# Patient Record
Sex: FEMALE | Race: BLACK OR AFRICAN AMERICAN | NOT HISPANIC OR LATINO
[De-identification: names, ages, dates, MRNs, and addresses within clinical notes are randomized per-mention and may not be internally consistent; named-entity substitution may affect disease eponyms.]

---

## 2022-07-30 ENCOUNTER — TRANSCRIPTION ENCOUNTER (OUTPATIENT)
Age: 77
End: 2022-07-30

## 2023-03-21 ENCOUNTER — APPOINTMENT (OUTPATIENT)
Dept: SURGERY | Facility: CLINIC | Age: 78
End: 2023-03-21

## 2023-04-05 ENCOUNTER — APPOINTMENT (OUTPATIENT)
Dept: SURGERY | Facility: CLINIC | Age: 78
End: 2023-04-05

## 2023-09-13 PROBLEM — Z00.00 ENCOUNTER FOR PREVENTIVE HEALTH EXAMINATION: Status: ACTIVE | Noted: 2023-09-13

## 2023-09-14 ENCOUNTER — APPOINTMENT (OUTPATIENT)
Dept: NEUROLOGY | Facility: CLINIC | Age: 78
End: 2023-09-14
Payer: MEDICARE

## 2023-09-14 ENCOUNTER — NON-APPOINTMENT (OUTPATIENT)
Age: 78
End: 2023-09-14

## 2023-09-14 VITALS
SYSTOLIC BLOOD PRESSURE: 138 MMHG | OXYGEN SATURATION: 97 % | BODY MASS INDEX: 25.61 KG/M2 | HEART RATE: 85 BPM | WEIGHT: 150 LBS | DIASTOLIC BLOOD PRESSURE: 82 MMHG | HEIGHT: 64 IN

## 2023-09-14 DIAGNOSIS — G43.911 MIGRAINE, UNSPECIFIED, INTRACTABLE, WITH STATUS MIGRAINOSUS: ICD-10-CM

## 2023-09-14 DIAGNOSIS — I26.99 OTHER PULMONARY EMBOLISM W/OUT ACUTE COR PULMONALE: ICD-10-CM

## 2023-09-14 DIAGNOSIS — E78.5 HYPERLIPIDEMIA, UNSPECIFIED: ICD-10-CM

## 2023-09-14 DIAGNOSIS — D32.0 BENIGN NEOPLASM OF CEREBRAL MENINGES: ICD-10-CM

## 2023-09-14 DIAGNOSIS — Z87.891 PERSONAL HISTORY OF NICOTINE DEPENDENCE: ICD-10-CM

## 2023-09-14 DIAGNOSIS — Z80.1 FAMILY HISTORY OF MALIGNANT NEOPLASM OF TRACHEA, BRONCHUS AND LUNG: ICD-10-CM

## 2023-09-14 DIAGNOSIS — I82.409 ACUTE EMBOLISM AND THROMBOSIS OF UNSPECIFIED DEEP VEINS OF UNSPECIFIED LOWER EXTREMITY: ICD-10-CM

## 2023-09-14 DIAGNOSIS — G35 MULTIPLE SCLEROSIS: ICD-10-CM

## 2023-09-14 DIAGNOSIS — E11.59 TYPE 2 DIABETES MELLITUS WITH OTHER CIRCULATORY COMPLICATIONS: ICD-10-CM

## 2023-09-14 DIAGNOSIS — Z86.69 PERSONAL HISTORY OF OTHER DISEASES OF THE NERVOUS SYSTEM AND SENSE ORGANS: ICD-10-CM

## 2023-09-14 PROCEDURE — 99205 OFFICE O/P NEW HI 60 MIN: CPT

## 2023-09-21 PROBLEM — E78.5 HYPERLIPIDEMIA: Status: ACTIVE | Noted: 2023-09-21

## 2023-09-21 PROBLEM — I82.409 DVT (DEEP VENOUS THROMBOSIS): Status: ACTIVE | Noted: 2023-09-21

## 2023-09-21 PROBLEM — G35 MULTIPLE SCLEROSIS: Status: ACTIVE | Noted: 2023-09-21

## 2023-09-21 PROBLEM — D32.0 INTRACRANIAL MENINGIOMA: Status: ACTIVE | Noted: 2023-09-21

## 2023-10-13 ENCOUNTER — TRANSCRIPTION ENCOUNTER (OUTPATIENT)
Age: 78
End: 2023-10-13

## 2023-10-13 DIAGNOSIS — G40.209 LOCALIZATION-RELATED (FOCAL) (PARTIAL) SYMPTOMATIC EPILEPSY AND EPILEPTIC SYNDROMES WITH COMPLEX PARTIAL SEIZURES, NOT INTRACTABLE, W/OUT STATUS EPILEPTICUS: ICD-10-CM

## 2024-01-08 ENCOUNTER — APPOINTMENT (OUTPATIENT)
Dept: NEUROLOGY | Facility: CLINIC | Age: 79
End: 2024-01-08
Payer: MEDICARE

## 2024-01-08 VITALS
DIASTOLIC BLOOD PRESSURE: 80 MMHG | HEIGHT: 64 IN | WEIGHT: 150 LBS | BODY MASS INDEX: 25.61 KG/M2 | SYSTOLIC BLOOD PRESSURE: 123 MMHG | OXYGEN SATURATION: 94 % | HEART RATE: 83 BPM

## 2024-01-08 PROCEDURE — 99215 OFFICE O/P EST HI 40 MIN: CPT

## 2024-02-07 ENCOUNTER — NON-APPOINTMENT (OUTPATIENT)
Age: 79
End: 2024-02-07

## 2024-02-16 ENCOUNTER — TRANSCRIPTION ENCOUNTER (OUTPATIENT)
Age: 79
End: 2024-02-16

## 2024-02-16 ENCOUNTER — NON-APPOINTMENT (OUTPATIENT)
Age: 79
End: 2024-02-16

## 2024-02-16 RX ORDER — LAMOTRIGINE 25 MG/1
25 TABLET ORAL
Qty: 120 | Refills: 1 | Status: DISCONTINUED | COMMUNITY
Start: 2024-01-08 | End: 2024-02-16

## 2024-02-27 ENCOUNTER — TRANSCRIPTION ENCOUNTER (OUTPATIENT)
Age: 79
End: 2024-02-27

## 2024-03-18 RX ORDER — LAMOTRIGINE 25 MG/1
25 TABLET ORAL
Qty: 240 | Refills: 0 | Status: DISCONTINUED | COMMUNITY
Start: 2024-02-16 | End: 2024-03-18

## 2024-03-18 RX ORDER — CARBAMAZEPINE 200 MG/1
200 CAPSULE, EXTENDED RELEASE ORAL AT BEDTIME
Qty: 30 | Refills: 3 | Status: DISCONTINUED | COMMUNITY
Start: 2023-10-13 | End: 2024-03-18

## 2024-04-18 ENCOUNTER — APPOINTMENT (OUTPATIENT)
Dept: NEUROLOGY | Facility: CLINIC | Age: 79
End: 2024-04-18
Payer: MEDICARE

## 2024-04-18 VITALS
DIASTOLIC BLOOD PRESSURE: 80 MMHG | SYSTOLIC BLOOD PRESSURE: 134 MMHG | OXYGEN SATURATION: 97 % | WEIGHT: 150 LBS | HEART RATE: 82 BPM | HEIGHT: 64 IN | BODY MASS INDEX: 25.61 KG/M2

## 2024-04-18 PROCEDURE — 99215 OFFICE O/P EST HI 40 MIN: CPT

## 2024-04-18 RX ORDER — CALCIUM CITRATE/VITAMIN D3 315MG-6.25
TABLET ORAL
Refills: 0 | Status: ACTIVE | COMMUNITY

## 2024-04-18 RX ORDER — METHENAMINE HIPPURATE 1 G/1
1 TABLET ORAL
Refills: 0 | Status: ACTIVE | COMMUNITY

## 2024-04-18 RX ORDER — WARFARIN 10 MG/1
10 TABLET ORAL
Refills: 0 | Status: ACTIVE | COMMUNITY

## 2024-04-18 RX ORDER — ACETAMINOPHEN 500 MG/1
500 TABLET ORAL
Refills: 0 | Status: ACTIVE | COMMUNITY

## 2024-04-18 RX ORDER — BACILLUS COAGULANS/INULIN 1B-250 MG
CAPSULE ORAL
Refills: 0 | Status: ACTIVE | COMMUNITY

## 2024-04-18 RX ORDER — ZINC SULFATE 50(220)MG
50 CAPSULE ORAL
Refills: 0 | Status: ACTIVE | COMMUNITY

## 2024-04-18 RX ORDER — MULTIVIT-MIN/IRON/FOLIC ACID/K 18-600-40
CAPSULE ORAL
Refills: 0 | Status: ACTIVE | COMMUNITY

## 2024-04-18 RX ORDER — OXYMETAZOLINE HYDROCHOLORIDE 0.05 G/100ML
SPRAY NASAL
Refills: 0 | Status: ACTIVE | COMMUNITY

## 2024-04-18 RX ORDER — SIMVASTATIN 20 MG/1
20 TABLET, FILM COATED ORAL
Refills: 0 | Status: ACTIVE | COMMUNITY

## 2024-04-18 RX ORDER — TOLTERODINE TARTRATE 2 MG/1
2 TABLET, FILM COATED ORAL
Refills: 0 | Status: ACTIVE | COMMUNITY

## 2024-04-18 RX ORDER — OMEPRAZOLE 20 MG/1
20 CAPSULE, DELAYED RELEASE ORAL
Refills: 0 | Status: ACTIVE | COMMUNITY

## 2024-04-18 RX ORDER — LORATADINE 5 MG
TABLET,CHEWABLE ORAL
Refills: 0 | Status: ACTIVE | COMMUNITY

## 2024-04-18 RX ORDER — WARFARIN 7.5 MG/1
7.5 TABLET ORAL
Refills: 0 | Status: ACTIVE | COMMUNITY

## 2024-04-19 DIAGNOSIS — F03.A4 UNSPECIFIED DEMENTIA, MILD, WITH ANXIETY: ICD-10-CM

## 2024-04-19 RX ORDER — DONEPEZIL HYDROCHLORIDE 5 MG/1
5 TABLET ORAL
Qty: 90 | Refills: 0 | Status: ACTIVE | COMMUNITY
Start: 2024-04-19 | End: 1900-01-01

## 2024-04-25 NOTE — DATA REVIEWED
[de-identified] : [facility] cEE/14-: EMU : mild generalized slowing suggestive of diffuse or multifocal dysfunction. No seizures were seen.  [facility] c[facility]G 10/1-10/13/24: EMU: 3 brief seizures of right temporal onset. Clinically, 2 of the seizures consisted of staring and behavioral arrest. With one seizure, the  patient experienced an earthy smell like after rain. This was followed by staring and slight stiffening of back and lifting of extremeties. WIth one seizure, EEG tech was in the room and she was unable to answer questions. Seizures occured out of wake and were about 1 minute in duration. Findings consistent with temporal lobe epilepsy. Some events of earthy smell had no EEG correlate. Occasional bitemporal right>left slowing was seen.  21 Ambulatory EEG: Normal 48-hour Ambulatory EEG. Multiple episodes of visual symptoms not associated with electrographic changes.   [de-identified] : 2/14/24: creatinine 0.6, vitamin b12 1255, vitamin d 74.3, carbamazepine 8.7, lamotrigine 2 , levetiracetam 27.2  10/11/23 vitamin b12 897, vitamin d 74.3 creatinine 0.6, LFT WNL , carbamazepine 3.1 low, keppra 17,2 (after holding)

## 2024-04-25 NOTE — ASSESSMENT
[FreeTextEntry1] : We will increase your lamotrigine Week 1: 125 mg in the morning and 100 mg at night Week 2: 125 mg every 12 hours Week 3: 150 mg in the morning and 125 mg at night  Week 4: 150 mg every 12 hours - call me when you get to this dose and we can go down on levetiracetam to 750 mg every 12 hours.  Continue levetiracetam 1 g every 12 hours   Continue carbamazepine 100 mg ER at night Continue vitamin d We will start a new medication for your - donepezil 5 mg at night for memory  Return to clinic in four months to see Dr. Josefina Samaniego

## 2024-04-25 NOTE — DISCUSSION/SUMMARY
[FreeTextEntry1] : Consuelo is a pleasant 78-year-old woman with a history of Diabetes Mellitus, pulmonary embolism, migraines, multiple sclerosis, wheelchair bound and history of meningioma. Was a patient of Agustín Gonzalez.    She is presenting for referral for EMU to characterize clinical events suspicious for seizures. Her friend estimates she has had over forty episodes in the past two years. The events are characterized by smelling something funny with change in speech pattern, confusion and rigidity of extremities. No tongue bite or loss of consciousness. Unclear if urinary incontinence. Still occurring despite increased levetiracetam to 1 g bid. She is also taking carbamazepine 200 mg ER. Found to have 3 brief seizures of right temporal onset. Lamotrigine was added during EMU 10/2023. She had another EMU 2/2024 - medications were not held - no seizures or spikes.   Doing okay but may have had another characteristic episode (earthy smell --> staring --> stiffening of extremities) 2 days ago. Will go up a little on lamotrigine and then go down on levetiracetam slightly. Reluctant to go down to much on CBZ as this is for trigeminal neuralgia. Will add aricept for memory.

## 2024-04-25 NOTE — PHYSICAL EXAM
[FreeTextEntry1] : Contracted, in wheelchair  Mental Status: Speech fluent. Poor historian.  Cranial Nerves  II: Pupils equal and reactive  III, IV, VI: EOMI VII : no asymmetry, no nasolabial fold flattening VIII: normal hearing to voice  IX, X: normal palatal elevation, uvula midline XI: 5/5 head turn and 5/5 shoulder shrug bilaterally XII : midline tongue protrusion Motor: increased tone in upper and lower extremities, strength is 3/5 in upper extremities distally (not proximally), with LUE strength > RUE strength.  Only trace movement in lower extremeties  Sensory: normal to light touch  Gait: deferred, in wheelchair

## 2024-04-25 NOTE — HISTORY OF PRESENT ILLNESS
[FreeTextEntry1] : Presenting for post EMU follow-up. Hospitalized from 2/14-2/16/24 for EMU. EEG at that time showed mild generalized slowing, no epileptiform patterns. Of note,  seizure medications were not held at that time. She did have a left shoulder abscess and was on doxycycline at that time. She did appear more confused during that time, although EEG did not show seizure. WBC was 12.5.   Since then, she thinks she may have had a seizure 2 days ago.  She had a "heavy smell" and her body became rigid. She had some pain in her back.   Spoke with her caretaker, Thalia King. She does appear better today, more lucid than the time of the hospitalization. Agrees that she would like something to help her memory.   Medications pantoprazole  carbamazepine 100 mg ER  lamotrigine 100 mg bid  levetiracetam 1 bid   ____________________________ 1/8/2024  Presenting for [facility]U follow-up. Was at [facility] from 10/10-10/13/23. Levetiracetam and carbamazepine were held to capture events.  She reported stereotyped events consisting of an "earthy smell" followed by a wave coming over her. Felt as if a balloon was deflating and then her arms extend outwards involuntarily (she can't move her legs). On day 3 after holding seizure medications, she had two seizures of right temporal onset. They consisted of an earthy smell followed by blank staring and stiffenign of bilateral upper extremities. EEGs were of right temporal onset. Tegretol was increased to 200 mg ER bid - keppra was not increased due to depression/worsening of mood.   Spoke with Denise. She states that since discharge,   carbamazepine 200 mg ER bid   Spoke with Denise Claudio has ups and downs.  Can be asleep for hours on end  intermittent - 200 mg ER bid  keppra 1 g bid    _______________________________________ Consuelo is a pleasant 78-year-old right-handed woman with a history of multiple sclerosis, wheelchair bound, optic neuritis, migraine headaches, type 2 diabetes mellitus and a pulmonary embolism.  She was referred by Dr. Bay for evaluation of epilepsy versus psychogenic events.   Much of the history was provided by her friend who she lives with Thalia King: [phone]  She has no history of tonic clonic seizures. No history of a breathing tube/no intubation.  Denise thinks her first event occurred around 2020, age 75. During the episodes, she gets rigid and stares. She smells something funny and her stomach gurgles? She is able to speak and call for her help. They are brief.  Once she was sitting at the computer and had that smell. She sat up in the chair. She was staring with a monotonous way of talking. She was able to ask for help. No loss of consciousness, no tongue bite. Unclear if loss of urine. She had an ambulatory EEG (72 hour) which was normal. She has had over forty episodes. She did cognitive testing at [facility] - diagnosed with multiple sclerosis. She continued to have events while on levetiracetam 500 mg bid. It has been increased back to 1 gram bid. She had 2-3 events over the past month. She is also taking carbamazepine, but this is for trigeminal neuralgia.   She is a patient of Dr. Bay., Reviewed most recent clinic note from 1/9/23.  He reports that she has a history of long-standing demyelinating disease. She is under the care of MS physicians at [facility].  She had seen Dr. 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 Retinal nerve fiber defects. 48-hour Ambulatory EEG 11/29/2021 was normal.  Neuropsych testing revealed that she does have decision making capacity with MOCA of 25/30. Mild depression seen. Noted to have 24-hour home health attendant.  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  Levetiracetam was tapered down as she was having events with no EEG corelate. She will need periodic monitoring of the meningioma.  As per notes, she had another event when keppra was 500 mg bid.   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  I explained that SPF 30 sunscreens block approximately 97 percent of the sun's harmful rays.  Sunscreens should be applied at least 15 minutes prior to expected sun exposure and then every 2 hours after that as long as sun exposure continues. If swimming or exercising sunscreen should be reapplied every 45 minutes to an hour after getting wet or sweating.  One ounce, or the equivalent of a shot glass full of sunscreen, is adequate to protect the skin not covered by a bathing suit. I also recommended a lip balm with a sunscreen as well. Sun protective clothing can be used in lieu of sunscreen but must be worn the entire time you are exposed to the sun's rays. Detail Level: Detailed

## 2024-05-02 ENCOUNTER — NON-APPOINTMENT (OUTPATIENT)
Age: 79
End: 2024-05-02

## 2024-05-02 RX ORDER — LAMOTRIGINE 100 MG/1
100 TABLET ORAL
Qty: 60 | Refills: 3 | Status: DISCONTINUED | COMMUNITY
Start: 2024-03-18 | End: 2024-05-02

## 2024-05-02 RX ORDER — LAMOTRIGINE 25 MG/1
25 TABLET ORAL
Qty: 120 | Refills: 0 | Status: DISCONTINUED | COMMUNITY
Start: 2024-04-19 | End: 2024-05-02

## 2024-05-02 RX ORDER — LEVETIRACETAM 750 MG/1
750 TABLET, FILM COATED ORAL TWICE DAILY
Qty: 180 | Refills: 3 | Status: ACTIVE | COMMUNITY
Start: 2024-05-02 | End: 1900-01-01

## 2024-05-02 RX ORDER — LEVETIRACETAM 500 MG/1
500 TABLET, FILM COATED ORAL
Qty: 360 | Refills: 0 | Status: DISCONTINUED | COMMUNITY
Start: 2024-03-18 | End: 2024-05-02

## 2024-05-03 ENCOUNTER — NON-APPOINTMENT (OUTPATIENT)
Age: 79
End: 2024-05-03

## 2024-05-20 ENCOUNTER — NON-APPOINTMENT (OUTPATIENT)
Age: 79
End: 2024-05-20

## 2024-05-24 ENCOUNTER — TRANSCRIPTION ENCOUNTER (OUTPATIENT)
Age: 79
End: 2024-05-24

## 2024-05-28 ENCOUNTER — NON-APPOINTMENT (OUTPATIENT)
Age: 79
End: 2024-05-28

## 2024-05-30 RX ORDER — LORAZEPAM 0.5 MG/1
0.5 TABLET ORAL DAILY
Qty: 2 | Refills: 0 | Status: ACTIVE | COMMUNITY
Start: 2024-05-30 | End: 1900-01-01

## 2024-06-06 RX ORDER — CARBAMAZEPINE 100 MG/1
100 TABLET, EXTENDED RELEASE ORAL AT BEDTIME
Qty: 90 | Refills: 0 | Status: DISCONTINUED | COMMUNITY
Start: 2024-03-18 | End: 2024-06-06

## 2024-06-08 ENCOUNTER — TRANSCRIPTION ENCOUNTER (OUTPATIENT)
Age: 79
End: 2024-06-08

## 2024-06-12 RX ORDER — LAMOTRIGINE 150 MG/1
150 TABLET ORAL TWICE DAILY
Qty: 180 | Refills: 3 | Status: DISCONTINUED | COMMUNITY
Start: 2024-05-02 | End: 2024-06-12

## 2024-06-12 RX ORDER — LAMOTRIGINE 100 MG/1
100 TABLET ORAL
Qty: 60 | Refills: 3 | Status: ACTIVE | COMMUNITY
Start: 2024-06-12 | End: 1900-01-01

## 2024-06-21 ENCOUNTER — NON-APPOINTMENT (OUTPATIENT)
Age: 79
End: 2024-06-21

## 2024-06-28 ENCOUNTER — APPOINTMENT (OUTPATIENT)
Dept: NEUROLOGY | Facility: CLINIC | Age: 79
End: 2024-06-28

## 2024-06-30 ENCOUNTER — NON-APPOINTMENT (OUTPATIENT)
Age: 79
End: 2024-06-30

## 2024-07-05 ENCOUNTER — TRANSCRIPTION ENCOUNTER (OUTPATIENT)
Age: 79
End: 2024-07-05

## 2024-07-17 DIAGNOSIS — R44.1 VISUAL HALLUCINATIONS: ICD-10-CM

## 2024-07-17 RX ORDER — QUETIAPINE FUMARATE 25 MG/1
25 TABLET ORAL
Qty: 45 | Refills: 3 | Status: ACTIVE | COMMUNITY
Start: 2024-07-17 | End: 1900-01-01

## 2024-08-02 ENCOUNTER — APPOINTMENT (OUTPATIENT)
Dept: NEUROLOGY | Facility: CLINIC | Age: 79
End: 2024-08-02
Payer: MEDICARE

## 2024-08-02 VITALS
HEIGHT: 64 IN | DIASTOLIC BLOOD PRESSURE: 76 MMHG | WEIGHT: 150 LBS | HEART RATE: 86 BPM | SYSTOLIC BLOOD PRESSURE: 126 MMHG | OXYGEN SATURATION: 95 % | BODY MASS INDEX: 25.61 KG/M2

## 2024-08-02 PROCEDURE — 99215 OFFICE O/P EST HI 40 MIN: CPT

## 2024-08-02 RX ORDER — WARFARIN 5 MG/1
5 TABLET ORAL
Refills: 0 | Status: ACTIVE | COMMUNITY

## 2024-08-02 RX ORDER — QUETIAPINE 400 MG/1
TABLET, FILM COATED ORAL
Refills: 0 | Status: ACTIVE | COMMUNITY

## 2024-08-02 RX ORDER — PANTOPRAZOLE SODIUM 40 MG/1
40 TABLET, DELAYED RELEASE ORAL
Refills: 0 | Status: ACTIVE | COMMUNITY

## 2024-08-02 RX ORDER — WARFARIN 2.5 MG/1
2.5 TABLET ORAL
Refills: 0 | Status: ACTIVE | COMMUNITY

## 2024-08-05 NOTE — HISTORY OF PRESENT ILLNESS
[FreeTextEntry1] : Last seen in clinic on 4/18/24. Doing okay. Presenting with daughter (aid).  Saw Dr. Horvath. He states she has cataract b/l but not having a big impact yet. Saw Dr. Basurto, from [facility] for ophthalmology. Really liked him. He states that vision changes may be related to MS. She does have a history of optic neuritis.  She is taking seroquel 12.5 mg at night. It does relax her so she can sleep through the night. Still seeing distressing images such as cats eating kittens. Thinks she is being bitten by bugs. Distressing imagery. She has a hard time not believing it is real.   Stopped donepezil. Could not tolerate it.   Admitted to EMU at [facility] from 7/1-7/5/24. cEEG with mild generalized slowing. Had numerous visual obscurations/hallucinations with no EEG correlate. Sundowned and was very confused and agitated. She asked if she could "discharge me as her doctor". She was confused and thought they changed her room.   7/2/24: lamotrigine level = 10.6, levetiracetam level = 37.1 , creatinine level 0.6,   Medications:  pantoprazole  seroquel 12.5 mg qhs  carbamazepine 100 mg ER  lamotrigine 100 mg bid  levetiracetam 750 mg bid  ______________________________________________ 4/18/24  Presenting for post EMU follow-up. Hospitalized from 2/14-2/16/24 for EMU. EEG at that time showed mild generalized slowing, no epileptiform patterns. Of note,  seizure medications were not held at that time. She did have a left shoulder abscess and was on doxycycline at that time. She did appear more confused during that time, although EEG did not show seizure. WBC was 12.5.   Since then, she thinks she may have had a seizure 2 days ago.  She had a "heavy smell" and her body became rigid. She had some pain in her back.   Spoke with her caretaker, Thalia King. She does appear better today, more lucid than the time of the hospitalization. Agrees that she would like something to help her memory.   Medications pantoprazole  carbamazepine 100 mg ER  lamotrigine 100 mg bid  levetiracetam 1 bid   ____________________________ 1/8/2024  Presenting for [facility] EMU follow-up. Was at [facility] from 10/10-10/13/23. Levetiracetam and carbamazepine were held to capture events.  She reported stereotyped events consisting of an "earthy smell" followed by a wave coming over her. Felt as if a balloon was deflating and then her arms extend outwards involuntarily (she can't move her legs). On day 3 after holding seizure medications, she had two seizures of right temporal onset. They consisted of an earthy smell followed by blank staring and stiffenign of bilateral upper extremities. EEGs were of right temporal onset. Tegretol was increased to 200 mg ER bid - keppra was not increased due to depression/worsening of mood.   Spoke with Denise. She states that since discharge,   carbamazepine 200 mg ER bid   Spoke with Denise Shanon has ups and downs.  Can be asleep for hours on end  intermittent - 200 mg ER bid  keppra 1 g bid    _______________________________________ Consuelo is a pleasant 78-year-old right-handed woman with a history of multiple sclerosis, wheelchair bound, optic neuritis, migraine headaches, type 2 diabetes mellitus and a pulmonary embolism.  She was referred by Dr. Bay for evaluation of epilepsy versus psychogenic events.   Much of the history was provided by her friend who she lives with Thalia King: [phone]  She has no history of tonic clonic seizures. No history of a breathing tube/no intubation.  Denise thinks her first event occurred around 2020, age 75. During the episodes, she gets rigid and stares. She smells something funny and her stomach gurgles? She is able to speak and call for her help. They are brief.  Once she was sitting at the computer and had that smell. She sat up in the chair. She was staring with a monotonous way of talking. She was able to ask for help. No loss of consciousness, no tongue bite. Unclear if loss of urine. She had an ambulatory EEG (72 hour) which was normal. She has had over forty episodes. She did cognitive testing at [facility] - diagnosed with multiple sclerosis. She continued to have events while on levetiracetam 500 mg bid. It has been increased back to 1 gram bid. She had 2-3 events over the past month. She is also taking carbamazepine, but this is for trigeminal neuralgia.   She is a patient of Dr. Bay., Reviewed most recent clinic note from 1/9/23.  He reports that she has a history of long-standing demyelinating disease. She is under the care of MS physicians at [facility].  She had seen Dr. 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 Retinal nerve fiber defects. 48-hour Ambulatory EEG 11/29/2021 was normal.  Neuropsych testing revealed that she does have decision making capacity with MOCA of 25/30. Mild depression seen. Noted to have 24-hour home health attendant.  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  Levetiracetam was tapered down as she was having events with no EEG corelate. She will need periodic monitoring of the meningioma.  As per notes, she had another event when keppra was 500 mg bid.   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

## 2024-08-05 NOTE — ASSESSMENT
[FreeTextEntry1] : Please continue levetiracetam (Keppra) 750 mg every 12 hours Continue lamotrigine 100 mg very 12 hours Can try taking seroquel 12.5 mg (1/2 tablet) every day around 6 pm for visual hallucinations If Nicole is not doing well, can take 1 tablet of seroquel (25 mg ) as needed Can try magnesium glycinate (can get this at [employer]) 400 mg every night to help with anxiety, insomnia. This may help her  Can consider adding memantine at next visit   Return to clinic in three months  Tentative plan for another EMU at [place] in March 2025  Call if seizure   Seizure Safety:  Wear a helmet when biking or horseback riding No unsupervised swimming Showers rather than baths - no bath alone - risk of drowning Adjust the temperature on hot water heater to avoid scalding If uncontrolled seizures, use microwave rather than stovetop Dont lock door in bathroom, bedroom or on places If fall off bed with seizures, try sleeping with mattress on the floor Use soft or padded furniture Avoid high ladders Take medication as prescribed Follow driving regulations of people with epilepsy. Please visit [employer]ile[employer] : https://www.epilepsy.com/.

## 2024-08-05 NOTE — HISTORY OF PRESENT ILLNESS
[FreeTextEntry1] : Last seen in clinic on 4/18/24. Doing okay. Presenting with daughter (aid).  Saw Dr. Horvath. He states she has cataract b/l but not having a big impact yet. Saw Dr. Basurto, from [facility] for ophthalmology. Really liked him. He states that vision changes may be related to MS. She does have a history of optic neuritis.  She is taking seroquel 12.5 mg at night. It does relax her so she can sleep through the night. Still seeing distressing images such as cats eating kittens. Thinks she is being bitten by bugs. Distressing imagery. She has a hard time not believing it is real.   Stopped donepezil. Could not tolerate it.   Admitted to EMU at [facility] from 7/1-7/5/24. cEEG with mild generalized slowing. Had numerous visual obscurations/hallucinations with no EEG correlate. Sundowned and was very confused and agitated. She asked if she could "discharge me as her doctor". She was confused and thought they changed her room.   7/2/24: lamotrigine level = 10.6, levetiracetam level = 37.1 , creatinine level 0.6,   Medications:  pantoprazole  seroquel 12.5 mg qhs  carbamazepine 100 mg ER  lamotrigine 100 mg bid  levetiracetam 750 mg bid  ______________________________________________ 4/18/24  Presenting for post EMU follow-up. Hospitalized from 2/14-2/16/24 for EMU. EEG at that time showed mild generalized slowing, no epileptiform patterns. Of note,  seizure medications were not held at that time. She did have a left shoulder abscess and was on doxycycline at that time. She did appear more confused during that time, although EEG did not show seizure. WBC was 12.5.   Since then, she thinks she may have had a seizure 2 days ago.  She had a "heavy smell" and her body became rigid. She had some pain in her back.   Spoke with her caretaker, Thalia King. She does appear better today, more lucid than the time of the hospitalization. Agrees that she would like something to help her memory.   Medications pantoprazole  carbamazepine 100 mg ER  lamotrigine 100 mg bid  levetiracetam 1 bid   ____________________________ 1/8/2024  Presenting for [facility] EMU follow-up. Was at [facility] from 10/10-10/13/23. Levetiracetam and carbamazepine were held to capture events.  She reported stereotyped events consisting of an "earthy smell" followed by a wave coming over her. Felt as if a balloon was deflating and then her arms extend outwards involuntarily (she can't move her legs). On day 3 after holding seizure medications, she had two seizures of right temporal onset. They consisted of an earthy smell followed by blank staring and stiffenign of bilateral upper extremities. EEGs were of right temporal onset. Tegretol was increased to 200 mg ER bid - keppra was not increased due to depression/worsening of mood.   Spoke with Denise. She states that since discharge,   carbamazepine 200 mg ER bid   Spoke with Denise Shanon has ups and downs.  Can be asleep for hours on end  intermittent - 200 mg ER bid  keppra 1 g bid    _______________________________________ Consuelo is a pleasant 78-year-old right-handed woman with a history of multiple sclerosis, wheelchair bound, optic neuritis, migraine headaches, type 2 diabetes mellitus and a pulmonary embolism.  She was referred by Dr. Bay for evaluation of epilepsy versus psychogenic events.   Much of the history was provided by her friend who she lives with Thalia King: [phone]  She has no history of tonic clonic seizures. No history of a breathing tube/no intubation.  eDnise thinks her first event occurred around 2020, age 75. During the episodes, she gets rigid and stares. She smells something funny and her stomach gurgles? She is able to speak and call for her help. They are brief.  Once she was sitting at the computer and had that smell. She sat up in the chair. She was staring with a monotonous way of talking. She was able to ask for help. No loss of consciousness, no tongue bite. Unclear if loss of urine. She had an ambulatory EEG (72 hour) which was normal. She has had over forty episodes. She did cognitive testing at [facility] - diagnosed with multiple sclerosis. She continued to have events while on levetiracetam 500 mg bid. It has been increased back to 1 gram bid. She had 2-3 events over the past month. She is also taking carbamazepine, but this is for trigeminal neuralgia.   She is a patient of Dr. Bay., Reviewed most recent clinic note from 1/9/23.  He reports that she has a history of long-standing demyelinating disease. She is under the care of MS physicians at [facility].  She had seen Dr. 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 Retinal nerve fiber defects. 48-hour Ambulatory EEG 11/29/2021 was normal.  Neuropsych testing revealed that she does have decision making capacity with MOCA of 25/30. Mild depression seen. Noted to have 24-hour home health attendant.  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  Levetiracetam was tapered down as she was having events with no EEG corelate. She will need periodic monitoring of the meningioma.  As per notes, she had another event when keppra was 500 mg bid.   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

## 2024-08-05 NOTE — PHYSICAL EXAM
[FreeTextEntry1] : Contracted, in wheelchair, awake, alert  Mental Status: Speech fluent. Poor historian.  Cranial Nerves  II: Pupils equal and reactive  III, IV, VI: EOMI VII : no asymmetry, no nasolabial fold flattening VIII: normal hearing to voice  Motor: increased tone in upper and lower extremities, strength is 3/5 in upper extremities distally (not proximally), with LUE strength > RUE strength.  Only trace movement in lower extremities  Sensory: normal to light touch  Gait: deferred, in wheelchair

## 2024-08-05 NOTE — HISTORY OF PRESENT ILLNESS
[FreeTextEntry1] : Last seen in clinic on 4/18/24. Doing okay. Presenting with daughter (aid).  Saw Dr. Horvath. He states she has cataract b/l but not having a big impact yet. Saw Dr. Basurto, from [facility] for ophthalmology. Really liked him. He states that vision changes may be related to MS. She does have a history of optic neuritis.  She is taking seroquel 12.5 mg at night. It does relax her so she can sleep through the night. Still seeing distressing images such as cats eating kittens. Thinks she is being bitten by bugs. Distressing imagery. She has a hard time not believing it is real.   Stopped donepezil. Could not tolerate it.   Admitted to EMU at [place] from 7/1-7/5/24. cEEG with mild generalized slowing. Had numerous visual obscurations/hallucinations with no EEG correlate. Sundowned and was very confused and agitated. She asked if she could "discharge me as her doctor". She was confused and thought they changed her room.   7/2/24: lamotrigine level = 10.6, levetiracetam level = 37.1 , creatinine level 0.6,   Medications:  pantoprazole  seroquel 12.5 mg qhs  carbamazepine 100 mg ER  lamotrigine 100 mg bid  levetiracetam 750 mg bid  ______________________________________________ 4/18/24  Presenting for post EMU follow-up. Hospitalized from 2/14-2/16/24 for EMU. EEG at that time showed mild generalized slowing, no epileptiform patterns. Of note,  seizure medications were not held at that time. She did have a left shoulder abscess and was on doxycycline at that time. She did appear more confused during that time, although EEG did not show seizure. WBC was 12.5.   Since then, she thinks she may have had a seizure 2 days ago.  She had a "heavy smell" and her body became rigid. She had some pain in her back.   Spoke with her caretaker, Thalia King. She does appear better today, more lucid than the time of the hospitalization. Agrees that she would like something to help her memory.   Medications pantoprazole  carbamazepine 100 mg ER  lamotrigine 100 mg bid  levetiracetam 1 bid   ____________________________ 1/8/2024  Presenting for [place] EMU follow-up. Was at [place] from 10/10-10/13/23. Levetiracetam and carbamazepine were held to capture events.  She reported stereotyped events consisting of an "earthy smell" followed by a wave coming over her. Felt as if a balloon was deflating and then her arms extend outwards involuntarily (she can't move her legs). On day 3 after holding seizure medications, she had two seizures of right temporal onset. They consisted of an earthy smell followed by blank staring and stiffenign of bilateral upper extremities. EEGs were of right temporal onset. Tegretol was increased to 200 mg ER bid - keppra was not increased due to depression/worsening of mood.   Spoke with Denise. She states that since discharge,   carbamazepine 200 mg ER bid   Spoke with Denise Shanon has ups and downs.  Can be asleep for hours on end  intermittent - 200 mg ER bid  keppra 1 g bid    _______________________________________ Consuelo is a pleasant 78-year-old right-handed woman with a history of multiple sclerosis, wheelchair bound, optic neuritis, migraine headaches, type 2 diabetes mellitus and a pulmonary embolism.  She was referred by Dr. Bay for evaluation of epilepsy versus psychogenic events.   Much of the history was provided by her friend who she lives with Thalia King: [phone]  She has no history of tonic clonic seizures. No history of a breathing tube/no intubation.  Denise thinks her first event occurred around 2020, age 75. During the episodes, she gets rigid and stares. She smells something funny and her stomach gurgles? She is able to speak and call for her help. They are brief.  Once she was sitting at the computer and had that smell. She sat up in the chair. She was staring with a monotonous way of talking. She was able to ask for help. No loss of consciousness, no tongue bite. Unclear if loss of urine. She had an ambulatory EEG (72 hour) which was normal. She has had over forty episodes. She did cognitive testing at [facility] - diagnosed with multiple sclerosis. She continued to have events while on levetiracetam 500 mg bid. It has been increased back to 1 gram bid. She had 2-3 events over the past month. She is also taking carbamazepine, but this is for trigeminal neuralgia.   She is a patient of Dr. Bay., Reviewed most recent clinic note from 1/9/23.  He reports that she has a history of long-standing demyelinating disease. She is under the care of MS physicians at [facility].  She had seen Dr. 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 Retinal nerve fiber defects. 48-hour Ambulatory EEG 11/29/2021 was normal.  Neuropsych testing revealed that she does have decision making capacity with MOCA of 25/30. Mild depression seen. Noted to have 24-hour home health attendant.  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  Levetiracetam was tapered down as she was having events with no EEG corelate. She will need periodic monitoring of the meningioma.  As per notes, she had another event when keppra was 500 mg bid.   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

## 2024-08-05 NOTE — DATA REVIEWED
[de-identified] : [facility]: 24: Mild generalized slowing suggestive of diffuse or multifocal dysfunction.  No findings of active epilepsy. Visual hallucinations without EEG correlate.  [facility] cEE/14-: EMU : mild generalized slowing suggestive of diffuse or multifocal dysfunction. No seizures were seen.  [facility] cEEG 10/1-10/13/24: EMU: 3 brief seizures of right temporal onset. Clinically, 2 of the seizures consisted of staring and behavioral arrest. With one seizure, the  patient experienced an earthy smell like after rain. This was followed by staring and slight stiffening of back and lifting of extremeties. WIth one seizure, EEG tech was in the room and she was unable to answer questions. Seizures occured out of wake and were about 1 minute in duration. Findings consistent with temporal lobe epilepsy. Some events of earthy smell had no EEG correlate. Occasional bitemporal right>left slowing was seen.  21 Ambulatory EEG: Normal 48-hour Ambulatory EEG. Multiple episodes of visual symptoms not associated with electrographic changes.   [de-identified] : 7/2024: Creatinine 0.6, LEV 37.1, lamotrigine 10.6   2/14/24: creatinine 0.6, vitamin b12 1255, vitamin d 74.3, carbamazepine 8.7, lamotrigine 2 , levetiracetam 27.2  10/11/23 vitamin b12 897, vitamin d 74.3 creatinine 0.6, LFT WNL , carbamazepine 3.1 low, keppra 17,2 (after holding)

## 2024-08-05 NOTE — DATA REVIEWED
[de-identified] : [facility]: 24: Mild generalized slowing suggestive of diffuse or multifocal dysfunction.  No findings of active epilepsy. Visual hallucinations without EEG correlate.  [facility] cEE/14-: EMU : mild generalized slowing suggestive of diffuse or multifocal dysfunction. No seizures were seen.  [facility] cEEG 10/1-10/13/24: EMU: 3 brief seizures of right temporal onset. Clinically, 2 of the seizures consisted of staring and behavioral arrest. With one seizure, the  patient experienced an earthy smell like after rain. This was followed by staring and slight stiffening of back and lifting of extremeties. WIth one seizure, EEG tech was in the room and she was unable to answer questions. Seizures occured out of wake and were about 1 minute in duration. Findings consistent with temporal lobe epilepsy. Some events of earthy smell had no EEG correlate. Occasional bitemporal right>left slowing was seen.  21 Ambulatory EEG: Normal 48-hour Ambulatory EEG. Multiple episodes of visual symptoms not associated with electrographic changes.   [de-identified] : 7/2024: Creatinine 0.6, LEV 37.1, lamotrigine 10.6   2/14/24: creatinine 0.6, vitamin b12 1255, vitamin d 74.3, carbamazepine 8.7, lamotrigine 2 , levetiracetam 27.2  10/11/23 vitamin b12 897, vitamin d 74.3 creatinine 0.6, LFT WNL , carbamazepine 3.1 low, keppra 17,2 (after holding)

## 2024-08-05 NOTE — DATA REVIEWED
[de-identified] : [facility]: 24: Mild generalized slowing suggestive of diffuse or multifocal dysfunction.  No findings of active epilepsy. Visual hallucinations without EEG correlate.  [facility] cEE/14-: EMU : mild generalized slowing suggestive of diffuse or multifocal dysfunction. No seizures were seen.  [facility] cEEG 10/1-10/13/24: EMU: 3 brief seizures of right temporal onset. Clinically, 2 of the seizures consisted of staring and behavioral arrest. With one seizure, the  patient experienced an earthy smell like after rain. This was followed by staring and slight stiffening of back and lifting of extremeties. WIth one seizure, EEG tech was in the room and she was unable to answer questions. Seizures occured out of wake and were about 1 minute in duration. Findings consistent with temporal lobe epilepsy. Some events of earthy smell had no EEG correlate. Occasional bitemporal right>left slowing was seen.  21 Ambulatory EEG: Normal 48-hour Ambulatory EEG. Multiple episodes of visual symptoms not associated with electrographic changes.   [de-identified] : 7/2024: Creatinine 0.6, LEV 37.1, lamotrigine 10.6   2/14/24: creatinine 0.6, vitamin b12 1255, vitamin d 74.3, carbamazepine 8.7, lamotrigine 2 , levetiracetam 27.2  10/11/23 vitamin b12 897, vitamin d 74.3 creatinine 0.6, LFT WNL , carbamazepine 3.1 low, keppra 17,2 (after holding)

## 2024-08-05 NOTE — DISCUSSION/SUMMARY
[FreeTextEntry1] : Consuelo is a pleasant 79-year-old woman with a history of Diabetes Mellitus, pulmonary embolism, migraines, multiple sclerosis, wheelchair bound and history of meningioma. Was a patient of Dr. Bay, [facility].   She is presenting for referral for EMU to characterize clinical events suspicious for seizures. Her friend estimates she has had over forty episodes in the past two years. The events are characterized by smelling something funny with change in speech pattern, confusion and rigidity of extremities. No tongue bite or loss of consciousness. Unclear if urinary incontinence.  Found to have 3 brief seizures of right temporal onset. Lamotrigine was added during EMU 10/2023. She had another EMU 2/2024 - medications were not held - no seizures or spikes. Additional EMU in July 2024. No epileptiform patterns or seizures, mild gen slow. Has distressing visual hallucinations. Think these are related to sundowning - vision checked and cataracts but no other eye pathology. Vision is 20/40 OU. On OCT she has retinal thinning - could be related to MS.   Think this could be related to dementia. Doing better on serqouel. Will change timing of dosing and can increase dose as needed.

## 2024-08-05 NOTE — ASSESSMENT
[FreeTextEntry1] : Please continue levetiracetam (Keppra) 750 mg every 12 hours Continue lamotrigine 100 mg very 12 hours Can try taking seroquel 12.5 mg (1/2 tablet) every day around 6 pm for visual hallucinations If Nicole is not doing well, can take 1 tablet of seroquel (25 mg ) as needed Can try magnesium glycinate (can get this at [employer]) 400 mg every night to help with anxiety, insomnia. This may help her  Can consider adding memantine at next visit   Return to clinic in three months  Tentative plan for another EMU at [place] in March 2025  Call if seizure   Seizure Safety:  Wear a helmet when biking or horseback riding No unsupervised swimming Showers rather than baths - no bath alone - risk of drowning Adjust the temperature on hot water heater to avoid scalding If uncontrolled seizures, use microwave rather than stovetop Dont lock door in bathroom, bedroom or on places If fall off bed with seizures, try sleeping with mattress on the floor Use soft or padded furniture Avoid high ladders Take medication as prescribed Follow driving regulations of people with epilepsy. Please visit Epile[employer] : [email or web address]://www.epilepsy.com/.

## 2024-08-05 NOTE — ASSESSMENT
[FreeTextEntry1] : Please continue levetiracetam (Keppra) 750 mg every 12 hours Continue lamotrigine 100 mg very 12 hours Can try taking seroquel 12.5 mg (1/2 tablet) every day around 6 pm for visual hallucinations If Nicole is not doing well, can take 1 tablet of seroquel (25 mg ) as needed Can try magnesium glycinate (can get this at [employer]) 400 mg every night to help with anxiety, insomnia. This may help her  Can consider adding memantine at next visit   Return to clinic in three months  Tentative plan for another EMU at [place] in March 2025  Call if seizure   Seizure Safety:  Wear a helmet when biking or horseback riding No unsupervised swimming Showers rather than baths - no bath alone - risk of drowning Adjust the temperature on hot water heater to avoid scalding If uncontrolled seizures, use microwave rather than stovetop Dont lock door in bathroom, bedroom or on places If fall off bed with seizures, try sleeping with mattress on the floor Use soft or padded furniture Avoid high ladders Take medication as prescribed Follow driving regulations of people with epilepsy. Please visit Epilepsy [employer] : [email or web address]://www.epilepsy.com/.

## 2024-08-22 ENCOUNTER — TRANSCRIPTION ENCOUNTER (OUTPATIENT)
Age: 79
End: 2024-08-22

## 2024-10-31 ENCOUNTER — NON-APPOINTMENT (OUTPATIENT)
Age: 79
End: 2024-10-31

## 2024-11-05 ENCOUNTER — APPOINTMENT (OUTPATIENT)
Dept: NEUROLOGY | Facility: CLINIC | Age: 79
End: 2024-11-05